# Patient Record
Sex: FEMALE | Race: OTHER | Employment: OTHER | URBAN - METROPOLITAN AREA
[De-identification: names, ages, dates, MRNs, and addresses within clinical notes are randomized per-mention and may not be internally consistent; named-entity substitution may affect disease eponyms.]

---

## 2022-05-18 ENCOUNTER — EMERGENCY VISIT (OUTPATIENT)
Dept: URBAN - METROPOLITAN AREA CLINIC 35 | Facility: CLINIC | Age: 77
End: 2022-05-18

## 2022-05-18 DIAGNOSIS — G47.30: ICD-10-CM

## 2022-05-18 DIAGNOSIS — H47.011: ICD-10-CM

## 2022-05-18 DIAGNOSIS — H26.40: ICD-10-CM

## 2022-05-18 PROCEDURE — 92250 FUNDUS PHOTOGRAPHY W/I&R: CPT

## 2022-05-18 PROCEDURE — 92014 COMPRE OPH EXAM EST PT 1/>: CPT

## 2022-05-18 ASSESSMENT — VISUAL ACUITY
OS_SC: 20/40-1
OU_SC: 20/40

## 2022-05-18 ASSESSMENT — TONOMETRY
OS_IOP_MMHG: 15
OD_IOP_MMHG: 14

## 2022-05-18 NOTE — PATIENT DISCUSSION
"Refer to Dr. Luda Sebastian or Tristanian Dura ASAP.  Pt has been seen at [facility] and had MRI which she says was ""normal"" but we do not have any results from the ER. "

## 2022-06-13 ENCOUNTER — EMERGENCY VISIT (OUTPATIENT)
Dept: URBAN - METROPOLITAN AREA CLINIC 35 | Facility: CLINIC | Age: 77
End: 2022-06-13

## 2022-06-13 DIAGNOSIS — H52.202: ICD-10-CM

## 2022-06-13 DIAGNOSIS — H47.011: ICD-10-CM

## 2022-06-13 DIAGNOSIS — H52.4: ICD-10-CM

## 2022-06-13 DIAGNOSIS — H26.40: ICD-10-CM

## 2022-06-13 DIAGNOSIS — G47.30: ICD-10-CM

## 2022-06-13 PROCEDURE — 92015 DETERMINE REFRACTIVE STATE: CPT

## 2022-06-13 PROCEDURE — 92133 CPTRZD OPH DX IMG PST SGM ON: CPT

## 2022-06-13 PROCEDURE — 92014 COMPRE OPH EXAM EST PT 1/>: CPT

## 2022-06-13 ASSESSMENT — TONOMETRY
OS_IOP_MMHG: 13
OD_IOP_MMHG: 13

## 2022-06-13 ASSESSMENT — VISUAL ACUITY: OS_CC: 20/40+2
